# Patient Record
Sex: MALE | URBAN - METROPOLITAN AREA
[De-identification: names, ages, dates, MRNs, and addresses within clinical notes are randomized per-mention and may not be internally consistent; named-entity substitution may affect disease eponyms.]

---

## 2022-12-27 ENCOUNTER — OFFICE VISIT (OUTPATIENT)
Dept: FAMILY MEDICINE CLINIC | Age: 37
End: 2022-12-27
Payer: COMMERCIAL

## 2022-12-27 VITALS
RESPIRATION RATE: 16 BRPM | TEMPERATURE: 97.4 F | BODY MASS INDEX: 45.1 KG/M2 | HEIGHT: 70 IN | SYSTOLIC BLOOD PRESSURE: 131 MMHG | WEIGHT: 315 LBS | HEART RATE: 76 BPM | OXYGEN SATURATION: 97 % | DIASTOLIC BLOOD PRESSURE: 74 MMHG

## 2022-12-27 DIAGNOSIS — E78.1 HYPERTRIGLYCERIDEMIA: ICD-10-CM

## 2022-12-27 DIAGNOSIS — G47.33 OSA ON CPAP: ICD-10-CM

## 2022-12-27 DIAGNOSIS — R63.5 RAPID WEIGHT GAIN: ICD-10-CM

## 2022-12-27 DIAGNOSIS — Z99.89 OSA ON CPAP: ICD-10-CM

## 2022-12-27 DIAGNOSIS — Z11.59 NEED FOR HEPATITIS C SCREENING TEST: ICD-10-CM

## 2022-12-27 DIAGNOSIS — M17.11 PRIMARY OSTEOARTHRITIS OF RIGHT KNEE: ICD-10-CM

## 2022-12-27 DIAGNOSIS — I48.0 PAROXYSMAL A-FIB (HCC): ICD-10-CM

## 2022-12-27 DIAGNOSIS — N39.0 RECURRENT UTI: ICD-10-CM

## 2022-12-27 DIAGNOSIS — N18.9 CHRONIC KIDNEY DISEASE, UNSPECIFIED CKD STAGE: ICD-10-CM

## 2022-12-27 DIAGNOSIS — E66.01 SEVERE OBESITY (BMI >= 40) (HCC): Primary | ICD-10-CM

## 2022-12-27 DIAGNOSIS — Q55.64 CONGENITAL BURIED PENIS: ICD-10-CM

## 2022-12-27 DIAGNOSIS — I10 PRIMARY HYPERTENSION: ICD-10-CM

## 2022-12-27 DIAGNOSIS — R45.89 DEPRESSED MOOD: ICD-10-CM

## 2022-12-27 PROCEDURE — 3078F DIAST BP <80 MM HG: CPT | Performed by: FAMILY MEDICINE

## 2022-12-27 PROCEDURE — 3075F SYST BP GE 130 - 139MM HG: CPT | Performed by: FAMILY MEDICINE

## 2022-12-27 PROCEDURE — 99204 OFFICE O/P NEW MOD 45 MIN: CPT | Performed by: FAMILY MEDICINE

## 2022-12-27 RX ORDER — DICLOFENAC SODIUM 75 MG/1
75 TABLET, DELAYED RELEASE ORAL 2 TIMES DAILY
COMMUNITY
Start: 2022-12-15 | End: 2023-01-14

## 2022-12-27 RX ORDER — CHOLECALCIFEROL (VITAMIN D3) 125 MCG
CAPSULE ORAL
COMMUNITY
Start: 2022-11-20

## 2022-12-27 RX ORDER — PANTOPRAZOLE SODIUM 40 MG/1
TABLET, DELAYED RELEASE ORAL
COMMUNITY
Start: 2022-12-01

## 2022-12-27 RX ORDER — PERPHENAZINE 2 MG
TABLET ORAL
COMMUNITY
Start: 2022-10-21

## 2022-12-27 RX ORDER — NITROFURANTOIN (MACROCRYSTALS) 100 MG/1
CAPSULE ORAL
COMMUNITY
Start: 2022-12-15

## 2022-12-27 RX ORDER — VALSARTAN AND HYDROCHLOROTHIAZIDE 320; 25 MG/1; MG/1
TABLET, FILM COATED ORAL
COMMUNITY
Start: 2022-11-20

## 2022-12-27 RX ORDER — METOPROLOL SUCCINATE 100 MG/1
TABLET, EXTENDED RELEASE ORAL
COMMUNITY
Start: 2022-12-01

## 2022-12-27 NOTE — PROGRESS NOTES
Chief Complaint   Patient presents with    Establish Care     New pt hx of depression noc at this time

## 2022-12-27 NOTE — PROGRESS NOTES
Family Medicine Initial Office Visit  Patient: Jackson Anderson  1985, 30 y.o., male  Encounter Date: 12/27/2022    ASSESSMENT & PLAN    ICD-10-CM ICD-9-CM    1. Severe obesity (BMI >= 40) (HCC)  E66.01 278.01 CBC W/O DIFF      METABOLIC PANEL, COMPREHENSIVE      LIPID PANEL      TSH 3RD GENERATION      T4, FREE      REFERRAL TO WEIGHT LOSS      2. Hypertriglyceridemia  Z43.2 012.3 METABOLIC PANEL, COMPREHENSIVE      LIPID PANEL      3. Rapid weight gain  R63.5 783.1 CBC W/O DIFF      METABOLIC PANEL, COMPREHENSIVE      LIPID PANEL      TSH 3RD GENERATION      T4, FREE      4. Paroxysmal A-fib (HCC)  I48.0 427.31 CBC W/O DIFF      METABOLIC PANEL, COMPREHENSIVE      LIPID PANEL      TSH 3RD GENERATION      T4, FREE      5. Primary hypertension  I10 401.9 CBC W/O DIFF      METABOLIC PANEL, COMPREHENSIVE      LIPID PANEL      6. Primary osteoarthritis of right knee  M17.11 715.16       7. Depressed mood  R45.89 799.29       8. Recurrent UTI  N39.0 599.0 REFERRAL TO UROLOGY      9. Chronic kidney disease, unspecified CKD stage  N18.9 585.9 REFERRAL TO UROLOGY      10. Congenital buried penis  Q55.64 752.65 REFERRAL TO UROLOGY      11. DINH on CPAP  G47.33 327.23     Z99.89 V46.8       12.  Need for hepatitis C screening test  Z11.59 V73.89 HEPATITIS C AB        Orders Placed This Encounter    CBC W/O DIFF     Standing Status:   Future     Standing Expiration Date:   38/31/8633    METABOLIC PANEL, COMPREHENSIVE     Standing Status:   Future     Standing Expiration Date:   12/27/2023    LIPID PANEL     Standing Status:   Future     Standing Expiration Date:   12/27/2023    TSH 3RD GENERATION     Standing Status:   Future     Standing Expiration Date:   12/27/2023    T4, FREE     Standing Status:   Future     Standing Expiration Date:   12/27/2023    HEPATITIS C AB - Future Default     Standing Status:   Future     Standing Expiration Date:   12/27/2023    Mega Weight Loss     Referral Priority:   Routine Referral Type:   Consultation     Referral Reason:   Specialty Services Required     Referred to Provider:   Alex Gudino MD     Number of Visits Requested:   [address] [facility]     Referral Priority:   Routine     Referral Type:   Consultation     Referral Reason:   Specialty Services Required     Referred to Provider:   Isaura Herrera MD     Requested Specialty:   Urology     Number of Visits Requested:   1    diclofenac EC (VOLTAREN) 75 mg EC tablet     Sig: Take 75 mg by mouth two (2) times a day. cholecalciferol (VITAMIN D3) (5000 Units /125 mcg) capsule    Omega 3-6-9 1,200 mg cap    metoprolol succinate (TOPROL-XL) 100 mg tablet    nitrofurantoin (MACRODANTIN) 100 mg capsule    pantoprazole (PROTONIX) 40 mg tablet    valsartan-hydroCHLOROthiazide (DIOVAN-HCT) 320-25 mg per tablet     Patient Instructions   Here today to estb care    Labs fasting per my orders    Continue care with cardiology    See a dentist    Add fiber (metamucil)    Plan your packed food in case you stay longer at work    Get accountability for exercise with smoeone in your life    Wl referral (medical)    Keep fu with ortho as needed    Take NSAIDS only with food    Stay with PAR for DINH    Ref to urology as discussed    Follow Cr closely may need care with nephro [facility]  Chief Complaint   Patient presents with    [address] pt hx of depression noc at this time         SOUTH Luque is a 30 y.o. male presenting today for establishing care.    Patient works as   Patient lives in a apt with roommate and partner   Patient was last seen by primary care Dr Varun Gr at [facility] on [address], last seen September     Exercise: trying to go on walks, a few times a week, workin  Diet / Eligio Calk now excluding eggs, watching calories trying for a higher protein content  Weight is fluctuating, it goes up and down--lowest he's been is about 370, he has a sedentary job, gained weight back 100 or so last year    Tobacco: no  EtOH:occ, not in a long time  Illicit Substances:no    Sexual Activity:yes one female partner, condoms and she's got an IUD  Declines std testing today      HTN--on valsartan hctz  Recurrent UTI-did have a urologist, trying to get back to that  Chronic Diarrhea (thought it was related to abx use for UTI, but he thinks he's sensitive to egg and dietary changes might be helping)  Afib (paroxysmal)--he sees Dr Arabella Baxter and he feels it when it happens  CKD/Renal impairment, he says this was from a urinary tract obstruction unrelated to stone  Depression/depressed mood  R knee injury--a few years ago slipped on ice and [facility] and he tried to just do supportive care  Then he was going through a very depressed mood and he tripped while cleaning the house, he felt a tearing feel in his knee, immediate swelling and hot and pain with ambulation and limited rom  Suspected at that time meniscal tear  Recently went to see Dr Adan Martinez at [facility]  Dx arthritis significantly  Now on diclofenac and got a steroid shot that helped a ton he tells me  100 lbs weight gain in the last year due to ortho injury without being able to walk and move      Was rxed macrobid at one point    Scheduled for gastro at some point--[facility]  Planning for endoscopy and cscope, he tells me he thinks he might have an ulcer--has had a lot of nsaids, doesn't traditionally take with food he says  Mid sternal ane upper epigastric pain, gnawing and sometimes temporarily improved with food but not consistent    Maternal GF smoker-lung ca  No one has colon cancer in family  Father is , had sepsis    Seeing a therapist through his E[facility] at [facility] help  During covid he was out of work a lot and that was a financial struggle  Was seeing psychiatrist at one point  Was on meds but had vivid dreams  Maybe was on lexapro, was on trazodone, felt tired, had terrible vivid disruptive dreams    Obesity: not interested in surgery    Hx buried penis his whole life, tells me not related to weight     Sleep apnea--on CPAP    HISTORICAL LABS  Result Type Result Value Relevant  Reference  Range  Interpretation Date  HDL 29.9 mg/dL >40.0 L 09/23/2022  10:41:00  Calculated LDL 69.3 mg/dL <100.0 No Flag 09/23/2022  10:41:00  Cholesterol 158 mg/dL <200 No Flag 09/23/2022  10:41:00  CHOL/HDL Ratio 5.3 Ratio <4.9 H 09/23/2022  10:41:00  Triglycerides 294 mg/dL <150 H 09/23/2022  10:41:00  VLDL 59 mg/dL 0-40 H 09/23/2022  10:41:00  eGFR Non--  American  49 mL/min/1.7 >60 L 09/23/2022  10:21:00  eGFR -  American  59 mL/min/1.7 >60 L 09/23/2022  10:21:00  Glucose 87 mg/dL 65-99 No Flag 09/23/2022  10:21:00  Sodium 142 mmol/L 136-145 No Flag 09/23/2022  10:21:00  Potassium 4.3 mmol/L 3.5-5.1 No Flag 09/23/2022  10:21:00  Chloride 105 mmol/L  No Flag 09/23/2022  10:21:00  CO2 21 mmol/L 20-31 No Flag 09/23/2022  10:21:00  Result Type Result Value Relevant  Reference  Range  Interpretation Date  BUN 30 mg/dL 9-23 H 09/23/2022  10:21:00  Creatinine 1.62 mg/dL 0.70-1.30 H 09/23/2022  10:21:00  BUN/Creat Ratio 18.5 Ratio 10.0-30.0 No Flag 09/23/2022  10:21:00  Calcium 9.3 mg/dL 8.3-10.6 No Flag 09/23/2022  10:21:00  Total Protein 7.4 g/dL 5.7-8.2 No Flag 09/23/2022  10:21:00  Albumin 4.2 g/dL 3.2-4.8 No Flag 09/23/2022  10:21:00  A/G Ratio 1.3 Ratio 1.0-2.5 No Flag 09/23/2022  10:21:00  Bilirubin Total 0.4 mg/dL 0.3-1.2 No Flag 09/23/2022  10:21:00  Alkaline  Phosphatase  82 U/L  No Flag 09/23/2022  10:21:00  AST 17 U/L <34 No Flag 09/23/2022  10:21:00  ALT 12 U/L 10-49 No Flag 09/23/2022  10:21:00  Calculated  Osmolality  299.55  mOsmol/kg  275..00 H 09/23/2022  10:21:00  FINAL REPORT Microbiology  results  -- A 05/27/2022  11:41:00  Hyaline Casts 0-8 /LPF 0-8 No Flag 05/25/2022  12:18:00  Result Type Result Value Relevant  Reference  Range  Interpretation Date  Color Yellow Yellow No Flag 2022  12:18:00  Clarity Cloudy Clear A 2022  12:18:00  Glucose Negative Negative No Flag 2022  12:18:00  Bilirubin Negative Negative No Flag 2022  12:18:00  Ketone Negative Negative No Flag 2022  12:18:00  Specific Gravity 1.015 1.005-1.030 No Flag 2022  12:18:00  pH 6.0 5.0-8.0 No Flag 2022  12:18:00  Protein Trace Negative A 2022  12:18:00  Urobilinogen 0.2 EU/dL 0.2-1.0 No Flag 2022  12:18:00  Nitrite Positive Negative A 2022  12:18:00  Blood 1+ Negative A 2022  12:18:00  Leukocyte Esterase 3+ Negative A 2022  12:18:00  Urine WBC 26-50 /HPF 0-5 A 2022  12:18:00  Urine RBC 4-5 /HPF 0-3 A 2022  12:18:00  Result Type Result Value Relevant  Reference  Range  Interpretation Date  Urine Bacteria 4+ /HPF Negative A 2022  12:18:00  Urine Epithelial Cells 0-5 /HPF 0-5 No Flag 2022  12:18:00  Immature  Granulocyte  Absolute  0.06 x10E3/uL 0.00-0.30 No Flag 2022  10:42:00  WBC 9.66 x10E3/uL 4.23-9.07 H 2022  10:42:00  RBC 5.35 x10E6/uL 4.63-6.08 No Flag 2022  10:42:00  Hemoglobin 15.1 g/dL 13.7-17.5 No Flag 2022  10:42:00  Hematocrit 48.8 % 40.1-51.0 No Flag 2022  10:42:00  MCV 91.2 fL 80.6-104.4 No Flag 2022  10:42:00  MCH 28.2 pg 23.9-32.5 No Flag 2022  10:42:00  MCHC 30.9 g/dL 28.6-33.2 No Flag 2022  10:42:00  RDW 14.7 % 11.6-14.4 H 2022  10:42:00  MPV 11.3 fL 9.4-12.4 No Flag 2022  10:42:00  Platelet Count 864 /-337 H 2022  10:42:00  Neutrophils 70.0 % 34.0-67.9 H 2022  10:42:00  Result Type Result Value Relevant  Reference  Range  Interpretation Date  Lymphocytes 17.2 % 21.8-53.1 L 2022  10:42:00  Monocytes 7.0 % 5.3-12.2 No Flag 2022  10:42:00  Eosinophils 4.6 % 0.8-7.0 No Flag 2022  10:42:00  Basophils 0.6 % 0.2-1.2 No Flag 2022  10:42:00  Immature  Granulocytes  0.6 % 0.0-0.4 H 05/25/2022  10:42:00  Neutrophil Absolute 6.76 x10E3/uL 1.78-5.38 H 05/25/2022  10:42:00  Lymphocyte  Absolute  1.66 x10E3/uL 1.32-3.57 No Flag 05/25/2022  10:42:00  Monocyte Absolute 0.68 x10E3/uL 0.30-0.82 No Flag 05/25/2022  10:42:00  Eosinophil Absolute 0.44 x10E3/uL 0.04-0.54 No Flag 05/25/2022  10:42:00  Basophil Absolute 0.06 x10E3/uL 0.01-0.08 No Flag 05/25/2022  10:42:00  HDL 36.4 mg/dL >40.0 L 05/25/2022  09:46:00  Cholesterol 154 mg/dL <200 No Flag 05/25/2022  09:46:00  CHOL/HDL Ratio 4.2 Ratio <4.9 No Flag 05/25/2022  09:46:00  Triglycerides 158 mg/dL <150 H 05/25/2022  09:46:00  Result Type Result Value Relevant  Reference  Range  Interpretation Date  VLDL 32 mg/dL 0-40 No Flag 05/25/2022  09:46:00  Calculated LDL 86.0 mg/dL <100.0 No Flag 05/25/2022  09:46:00  Glucose 104 mg/dL 65-99 H 05/25/2022  09:45:00  eGFR Non-African-  American  46 mL/min/1.7 >60 L 05/25/2022  09:45:00  eGFR -  American  56 mL/min/1.7 >60 L 05/25/2022  09:45:00  Sodium 139 mmol/L 136-145 No Flag 05/25/2022  09:45:00  Potassium 4.3 mmol/L 3.5-5.1 No Flag 05/25/2022  09:45:00  Chloride 102 mmol/L  No Flag 05/25/2022  09:45:00  CO2 25 mmol/L 20-31 No Flag 05/25/2022  09:45:00  BUN 40 mg/dL 9-23 H 05/25/2022  09:45:00  Creatinine 1.69 mg/dL 0.70-1.30 H 05/25/2022  09:45:00  BUN/Creat Ratio 23.7 Ratio 10.0-30.0 No Flag 05/25/2022  09:45:00  Calcium 10.0 mg/dL 8.3-10.6 No Flag 05/25/2022  09:45:00  Total Protein 7.7 g/dL 5.7-8.2 No Flag 05/25/2022  09:45:00  Result Type Result Value Relevant  Reference  Range  Interpretation Date  Albumin 4.2 g/dL 3.2-4.8 No Flag 05/25/2022  09:45:00  A/G Ratio 1.2 Ratio 1.0-2.5 No Flag 05/25/2022  09:45:00  Bilirubin Total 0.5 mg/dL 0.3-1.2 No Flag 05/25/2022  09:45:00  Alkaline  Phosphatase  79 U/L  No Flag 05/25/2022  09:45:00  AST 17 U/L <34 No Flag 05/25/2022  09:45:00  ALT 10 U/L 10-49 No Flag 05/25/2022  09:45:00  Calculated  Osmolality  298.06  mOsmol/kg  275..00 H 05/25/2022  09:45:00  T4 Free 1.26 ng/dL 0.89-1.76 No Flag 05/25/2022  09:42:00  TSH 1.17 uIU/mL 0.55-4.78 No Flag 05/25/2022  09:41:00  T3 Total 102 ng/dL  No Flag 05/25/2022  09:40:00  Vitamin B12 285 pg/mL 211-911 No Flag 05/25/2022  09:40:00  Vitamin D 32.2 ng/mL 30.0-100.0 No Flag 05/25/2022  09:39:00  HbA1C 6.0 % 4.8-5.6 H 05/25/2022  09:38:00    Has FMLA for his knee from his prior pcp--his work suggested bc of his diarrhea, if he has any findings he should have paperwork pulled up for that  Review of Systems  A 12 point review of systems was negative except as noted here or in the HPI. OBJECTIVE  Visit Vitals  /74   Pulse 76   Temp 97.4 °F (36.3 °C)   Resp 16   Ht 5' 10\" (1.778 m)   Wt (!) 545 lb (247.2 kg)   SpO2 97%   BMI 78.20 kg/m²       Physical Exam  Vitals and nursing note reviewed. Constitutional:       General: He is not in acute distress. Appearance: Normal appearance. He is normal weight. He is not ill-appearing, toxic-appearing or diaphoretic. Comments: Walking with single point cane   HENT:      Head: Normocephalic and atraumatic. Right Ear: External ear normal.      Left Ear: External ear normal.      Mouth/Throat:      Mouth: Mucous membranes are moist.   Eyes:      General: No scleral icterus. Extraocular Movements: Extraocular movements intact. Pupils: Pupils are equal, round, and reactive to light. Cardiovascular:      Rate and Rhythm: Normal rate and regular rhythm. Heart sounds: Normal heart sounds. No murmur heard. No friction rub. No gallop. Pulmonary:      Effort: Pulmonary effort is normal. No respiratory distress. Breath sounds: No stridor. No wheezing or rhonchi. Comments: Distant breath sounds  Musculoskeletal:      Right lower leg: Edema present. Left lower leg: Edema present. Skin:     Coloration: Skin is not jaundiced or pale. Findings: No bruising, erythema, lesion or rash.    Neurological:      General: No focal deficit present. Mental Status: He is alert. Cranial Nerves: No cranial nerve deficit. Gait: Gait normal.   Psychiatric:         Mood and Affect: Mood normal.         Behavior: Behavior normal.         Thought Content: Thought content normal.         Judgment: Judgment normal.       No results found for any visits on 12/27/22. HISTORICAL  Reviewed and updated today, and as noted below:    Past Medical History:   Diagnosis Date    A-fib ([address]     Arthritis     Depression     GERD (gastroesophageal reflux disease)     Hypertension      History reviewed. No pertinent surgical history. Family History   Problem Relation Age of Onset    No Known Problems Mother      Social History     Tobacco Use   Smoking Status Never   Smokeless Tobacco Never     Social History     Socioeconomic History    Marital status: SINGLE   Tobacco Use    Smoking status: Never    Smokeless tobacco: Never     Allergies   Allergen Reactions    Doxycycline Palpitations       No visits with results within 3 Month(s) from this visit. Latest known visit with results is:   No results found for any previous visit. Bal Bright MD  [facility]  12/27/22 10:36 AM    Portions of this note may have been populated using smart dictation software and may have \"sounds-like\" errors present. Pt was counseled on risks, benefits and alternatives of treatment options. All questions were asked and answered and the patient was agreeable with the treatment plan as outlined.

## 2022-12-27 NOTE — PATIENT INSTRUCTIONS
Here today to estb care    Labs fasting per my orders    Continue care with cardiology    See a dentist    Add fiber (metamucil)    Plan your packed food in case you stay longer at work    Get accountability for exercise with smoeone in your life    Wl referral (medical)    Keep fu with ortho as needed    Take NSAIDS only with food    Stay with PAR for DINH    Ref to urology as discussed    Follow Cr closely may need care with nephro down th addie

## 2023-01-03 RX ORDER — METOPROLOL SUCCINATE 100 MG/1
100 TABLET, EXTENDED RELEASE ORAL DAILY
Qty: 90 TABLET | Refills: 0 | Status: SHIPPED | OUTPATIENT
Start: 2023-01-03

## 2023-01-11 DIAGNOSIS — E66.01 MORBID OBESITY (HCC): ICD-10-CM

## 2023-01-11 DIAGNOSIS — Z76.89 ENCOUNTER FOR WEIGHT MANAGEMENT: Primary | ICD-10-CM

## 2023-01-12 NOTE — PROGRESS NOTES
Patient attended our VIRTUAL Medically Supervised Weight Loss New Patient Orientation on Wednesday January 11, 2023 where we discussed:  New Direction Very Low and the Low Calorie diet details  Medical Supervision  Nutrition education  Cost of Meal Replacements

## 2023-01-23 ENCOUNTER — PATIENT MESSAGE (OUTPATIENT)
Dept: FAMILY MEDICINE CLINIC | Age: 38
End: 2023-01-23

## 2023-01-23 RX ORDER — NITROFURANTOIN 25; 75 MG/1; MG/1
100 CAPSULE ORAL DAILY
Qty: 30 CAPSULE | Refills: 1 | Status: SHIPPED | OUTPATIENT
Start: 2023-01-23

## 2023-01-23 RX ORDER — VALSARTAN AND HYDROCHLOROTHIAZIDE 320; 25 MG/1; MG/1
1 TABLET, FILM COATED ORAL DAILY
Qty: 90 TABLET | Refills: 0 | Status: SHIPPED | OUTPATIENT
Start: 2023-01-23

## 2023-01-23 NOTE — TELEPHONE ENCOUNTER
From: Minus Landau  To: David Michaud MD  Sent: 1/23/2023 9:25 AM EST  Subject: Medication refill. Good morning Doctor Jose Nurse,    I apologize for the bother, but I am contacting you for additional refills that are not yet entered through the sariah. My Valsartan is out. Additionally, I have yet to be able to schedule a meeting with the urologist and I'm also out of antibiotics. I've attached pictures of the empty bottles for reference on the prescription and dosage. Any help would be greatly appreciated. Thank you very much!

## 2023-03-02 ENCOUNTER — OFFICE VISIT (OUTPATIENT)
Dept: FAMILY MEDICINE CLINIC | Age: 38
End: 2023-03-02

## 2023-03-02 VITALS
RESPIRATION RATE: 20 BRPM | OXYGEN SATURATION: 96 % | BODY MASS INDEX: 45.1 KG/M2 | TEMPERATURE: 97.2 F | HEIGHT: 70 IN | DIASTOLIC BLOOD PRESSURE: 69 MMHG | HEART RATE: 83 BPM | WEIGHT: 315 LBS | SYSTOLIC BLOOD PRESSURE: 131 MMHG

## 2023-03-02 DIAGNOSIS — N18.30 STAGE 3 CHRONIC KIDNEY DISEASE, UNSPECIFIED WHETHER STAGE 3A OR 3B CKD (HCC): Primary | ICD-10-CM

## 2023-03-02 DIAGNOSIS — E66.01 SEVERE OBESITY (BMI >= 40) (HCC): ICD-10-CM

## 2023-03-02 DIAGNOSIS — I10 PRIMARY HYPERTENSION: ICD-10-CM

## 2023-03-02 DIAGNOSIS — I48.0 PAROXYSMAL A-FIB (HCC): ICD-10-CM

## 2023-03-02 DIAGNOSIS — R25.2 LEG CRAMPING: ICD-10-CM

## 2023-03-02 RX ORDER — DICLOFENAC SODIUM 75 MG/1
75 TABLET, DELAYED RELEASE ORAL 2 TIMES DAILY
COMMUNITY
Start: 2023-02-06 | End: 2023-03-08

## 2023-03-02 NOTE — PATIENT INSTRUCTIONS
Keto weight loss medically supervised:  [email or web address]    CKD3 was known prior, will watch, caution with NSAIDS    pAfib per cardiology    Bp is at goal today    Pending urology appt     Try magnesium oxide 400-800 mg at bedtime

## 2023-03-02 NOTE — PROGRESS NOTES
Family Medicine Follow-Up Progress Note  Patient: Lilia Denny  1985, 41 y.o., male  Encounter Date: 3/2/2023    ASSESSMENT & PLAN    ICD-10-CM ICD-9-CM    1. Stage 3 chronic kidney disease, unspecified whether stage 3a or 3b CKD ([facility])  N18.30 585.3 REFERRAL TO NEPHROLOGY      2. Paroxysmal A-fib ([facility])  I48.0 427.31       3. Severe obesity (BMI >= 40) ([facility])  E66.01 278.01 naltrexone-buPROPion (CONTRAVE) 8-90 mg TbER ER tablet      REFERRAL TO WEIGHT LOSS      4. Primary hypertension  I10 401.9       5. Leg cramping  R25.2 729.82           Orders Placed This Encounter    REFERRAL TO WEIGHT LOSS     Referral Priority:   Routine     Referral Type:   Consultation     Referral Reason:   Specialty Services Required     Referred to Provider:   aMrtinez Solis MD     Number of Visits Requested:   [facility]     Referral Priority:   Routine     Referral Type:   Consultation     Referral Reason:   Specialty Services Required     Referred to Provider:   Lord Meri MD     Number of Visits Requested:   1    diclofenac EC (VOLTAREN) 75 mg EC tablet     Sig: Take 75 mg by mouth two (2) times a day. naltrexone-buPROPion (CONTRAVE) 8-90 mg TbER ER tablet     Sig: First Month: Contrave 8mg/90mg #70 Week 1 : 1 Tab AM Week 2 : 1 Tab AM, 1 Tab PM Week 3 : 2 Tab AM, 1 Tab PM Week 4 : 2 Tab AM, 2 Tab PM     Dispense:  70 Tablet     Refill:  0       Patient Instructions   Keto weight loss medically supervised:  [email or web address]    CKD3 was known prior, will watch, caution with NSAIDS    pAfib per cardiology    Bp is at goal today    Pending urology appt     Try magnesium oxide 400-800 mg at bedtime     CHIEF COMPLAINT  Chief Complaint   Patient presents with    Follow-up     Patient would like to discuss weight loss.        SUBJECTIVE  Lilia Denny is a 41 y.o. male presenting today for follow up    Patient tells me that he attended the zoom call for the medical weight loss  The cost of medical weight loss was higher  He'd be ok to get [facility] the [facility]     He's working on lifestyle interventions  He's packing lunch  He's had wellbutrin before for mood and he did well with that    Desires weight loss  Desires non surgial options    Labs done prior to visit reviewed  ROS  Review of Systems  A 12 point review of systems was negative except as noted here or in the HPI. OBJECTIVE  Visit Vitals  /69 (BP 1 Location: Left arm, BP Patient Position: Sitting, BP Cuff Size: Thigh)   Pulse 83   Temp 97.2 °F (36.2 °C) (Temporal)   Resp 20   Ht 5' 10\" (1.778 m)   Wt (!) 545 lb (247.2 kg)   SpO2 96%   BMI 78.20 kg/m²       Physical Exam  Vitals reviewed. Constitutional:       General: He is not in acute distress. Appearance: Normal appearance. He is normal weight. He is not ill-appearing, toxic-appearing or diaphoretic. HENT:      Head: Normocephalic and atraumatic. Right Ear: External ear normal.      Left Ear: External ear normal.      Mouth/Throat:      Mouth: Mucous membranes are moist.   Eyes:      General: No scleral icterus. Neck:      Comments: Multiple skin tages, scabbed one at posterior R neck  Cardiovascular:      Heart sounds: No friction rub. No gallop. Pulmonary:      Effort: Pulmonary effort is normal. No respiratory distress. Breath sounds: No stridor. No wheezing or rhonchi. Abdominal:      General: Bowel sounds are normal.      Palpations: Abdomen is soft. Musculoskeletal:      Right lower leg: No edema. Left lower leg: No edema. Skin:     Coloration: Skin is not jaundiced or pale. Findings: No bruising, erythema, lesion or rash. Neurological:      General: No focal deficit present. Mental Status: He is alert. Cranial Nerves: No cranial nerve deficit. Gait: Gait normal.   Psychiatric:         Mood and Affect: Mood normal.         Behavior: Behavior normal.         Thought Content:  Thought content normal.         Judgment: Judgment normal.       No results found for any visits on 03/02/23. HISTORICAL  Reviewed and updated today, and as noted below:    Past Medical History:   Diagnosis Date    A-fib ([address]     Arthritis     Depression     GERD (gastroesophageal reflux disease)     Hypertension      History reviewed. No pertinent surgical history. Family History   Problem Relation Age of Onset    No Known Problems Mother      Social History     Tobacco Use   Smoking Status Never   Smokeless Tobacco Never     Social History     Socioeconomic History    Marital status: SINGLE   Tobacco Use    Smoking status: Never    Smokeless tobacco: Never     Allergies   Allergen Reactions    Doxycycline Palpitations       LAB REVIEW  Lab Results   Component Value Date/Time    Sodium 143 12/27/2022 12:36 PM    Potassium 5.0 12/27/2022 12:36 PM    Chloride 110 (H) 12/27/2022 12:36 PM    CO2 29 12/27/2022 12:36 PM    Anion gap 4 (L) 12/27/2022 12:36 PM    Glucose 89 12/27/2022 12:36 PM    BUN 33 (H) 12/27/2022 12:36 PM    Creatinine 1.90 (H) 12/27/2022 12:36 PM    BUN/Creatinine ratio 17 12/27/2022 12:36 PM    Calcium 9.0 12/27/2022 12:36 PM    Bilirubin, total 0.7 12/27/2022 12:36 PM    Alk.  phosphatase 83 12/27/2022 12:36 PM    Protein, total 7.4 12/27/2022 12:36 PM    Albumin 3.4 (L) 12/27/2022 12:36 PM    Globulin 4.0 12/27/2022 12:36 PM    A-G Ratio 0.9 (L) 12/27/2022 12:36 PM    ALT (SGPT) 19 12/27/2022 12:36 PM     Lab Results   Component Value Date/Time    WBC 8.9 12/27/2022 12:36 PM    HGB 13.9 12/27/2022 12:36 PM    HCT 42.8 12/27/2022 12:36 PM    PLATELET 863 68/19/6503 12:36 PM    MCV 88.6 12/27/2022 12:36 PM     No results found for: HBA1C, JGC2ZFTU, JPQ5MVRU, OSL6KDMZ  Lab Results   Component Value Date/Time    Cholesterol, total 167 12/27/2022 12:36 PM    HDL Cholesterol 41 12/27/2022 12:36 PM    LDL, calculated 82.2 12/27/2022 12:36 PM    VLDL, calculated 43.8 12/27/2022 12:36 PM    Triglyceride 219 (H) 12/27/2022 12:36 PM    CHOL/HDL Ratio 4.1 12/27/2022 12:36 PM           Flor Simon MD  [facility]  03/02/23 1:35 PM    Portions of this note may have been populated using smart dictation software and may have \"sounds-like\" errors present. Pt was counseled on risks, benefits and alternatives of treatment options. All questions were asked and answered and the patient was agreeable with the treatment plan as outlined.

## 2023-03-02 NOTE — PROGRESS NOTES
Chief Complaint   Patient presents with    Follow-up     1. Have you been to the ER, urgent care clinic since your last visit? Hospitalized since your last visit? No    2. Have you seen or consulted any other health care providers outside of the [address] [place] [address] since your last visit? Include any pap smears or colon screening.  No

## 2023-03-13 ENCOUNTER — TELEPHONE (OUTPATIENT)
Dept: FAMILY MEDICINE CLINIC | Age: 38
End: 2023-03-13

## 2023-03-13 NOTE — TELEPHONE ENCOUNTER
I would defer the management of phentermine to a weight loss specialist, this is not a medication I manage often and is only indicated for 12 weeks according to FDA  We could consider wellbutrin alone (1/2 of contrave) if he is ok with that while he's getting plugged into the weight loss team (already info given/referral placed)

## 2023-03-13 NOTE — TELEPHONE ENCOUNTER
Contreve not covered. Possible alternatives below.            Benzphetamine 50 Mg Tab Not Required  Diethylpropion (Tab) Not Required  Lomaira Not Required  Phendimetrazine Tartrate (Tab) Not Required  Phentermine 15 Mg Cap Not Required  Qsymia 7.5-46 Mg CM24 Not Required

## 2023-04-04 RX ORDER — METOPROLOL SUCCINATE 100 MG/1
TABLET, EXTENDED RELEASE ORAL
Qty: 90 TABLET | Refills: 0
Start: 2023-04-04

## 2023-04-24 RX ORDER — VALSARTAN AND HYDROCHLOROTHIAZIDE 320; 25 MG/1; MG/1
1 TABLET, FILM COATED ORAL DAILY
Qty: 90 TABLET | Refills: 0 | Status: SHIPPED | OUTPATIENT
Start: 2023-04-24

## 2023-05-22 ENCOUNTER — PATIENT MESSAGE (OUTPATIENT)
Facility: CLINIC | Age: 38
End: 2023-05-22

## 2023-05-24 NOTE — TELEPHONE ENCOUNTER
From: Evelyn Liang  Sent: 5/23/2023 7:24 PM EDT  To: Nenita Cabral [facility] Practice Clinical Staff  Subject: Urology Referral and FMLA    Thank you. I will be setting up an appointment ASAP. I've attached a PDF of the FMLA documents.  I would need two separate documents for the two conditions.    -Presley Cummings

## 2023-06-05 ENCOUNTER — TELEMEDICINE (OUTPATIENT)
Facility: CLINIC | Age: 38
End: 2023-06-05
Payer: COMMERCIAL

## 2023-06-05 DIAGNOSIS — Z78.9 ANTIBIOTIC DRUG INTOLERANCE: ICD-10-CM

## 2023-06-05 DIAGNOSIS — I10 ESSENTIAL (PRIMARY) HYPERTENSION: ICD-10-CM

## 2023-06-05 DIAGNOSIS — R39.9 UTI SYMPTOMS: ICD-10-CM

## 2023-06-05 DIAGNOSIS — N39.0 CHRONIC UTI: ICD-10-CM

## 2023-06-05 DIAGNOSIS — N18.31 STAGE 3A CHRONIC KIDNEY DISEASE (HCC): Primary | ICD-10-CM

## 2023-06-05 DIAGNOSIS — E55.9 VITAMIN D DEFICIENCY: ICD-10-CM

## 2023-06-05 DIAGNOSIS — N18.31 STAGE 3A CHRONIC KIDNEY DISEASE (HCC): ICD-10-CM

## 2023-06-05 DIAGNOSIS — G56.03 BILATERAL CARPAL TUNNEL SYNDROME: ICD-10-CM

## 2023-06-05 DIAGNOSIS — Z79.2 CHRONIC ANTIBIOTIC SUPPRESSION: ICD-10-CM

## 2023-06-05 PROCEDURE — 99214 OFFICE O/P EST MOD 30 MIN: CPT | Performed by: FAMILY MEDICINE

## 2023-06-05 RX ORDER — NITROFURANTOIN 25; 75 MG/1; MG/1
100 CAPSULE ORAL DAILY
Qty: 90 CAPSULE | Refills: 1 | Status: SHIPPED | OUTPATIENT
Start: 2023-06-05

## 2023-06-05 NOTE — PROGRESS NOTES
Call back - Left message to call back with any questions or concerns.   
Chief Complaint   Patient presents with    Follow-up     Fu for fmla paper work for urinary and gi issues which are being followed by pcp
under the [facility] waiver authority and the [employer] Act, this Virtual  Visit was conducted, with patient's (and/or legal guardian's) consent, to reduce the patient's risk of exposure to COVID-19 and provide necessary medical care. Services were provided through a video synchronous discussion virtually to substitute for in-person clinic visit. Patient and provider were located at their individual homes. Alesia Butcher MD  [facility]  06/05/23 4:30 PM     Portions of this note may have been populated using smart dictation software and may have \"sounds-like\" errors present.

## 2023-07-03 ENCOUNTER — PATIENT MESSAGE (OUTPATIENT)
Facility: CLINIC | Age: 38
End: 2023-07-03

## 2023-07-03 RX ORDER — METOPROLOL SUCCINATE 100 MG/1
100 TABLET, EXTENDED RELEASE ORAL DAILY
Qty: 90 TABLET | Refills: 0 | Status: SHIPPED | OUTPATIENT
Start: 2023-07-03

## 2023-07-03 RX ORDER — VALSARTAN AND HYDROCHLOROTHIAZIDE 320; 25 MG/1; MG/1
1 TABLET, FILM COATED ORAL DAILY
Qty: 90 TABLET | Refills: 0 | Status: SHIPPED | OUTPATIENT
Start: 2023-07-03

## 2023-07-03 NOTE — TELEPHONE ENCOUNTER
From: Tonia Ozuna  To: Dr. Keily Curiel: 7/3/2023 2:30 AM EDT  Subject: Prescription Refill Request 7/3    Dr Zoe De Luna,    I am in need of a refill for both my Valsartan and Metoprolol. For some reason I cannot request a refill through the shanell for those two as of yet. Thank you!

## 2023-07-13 ENCOUNTER — OFFICE VISIT (OUTPATIENT)
Age: 38
End: 2023-07-13
Payer: COMMERCIAL

## 2023-07-13 VITALS — HEIGHT: 71 IN | BODY MASS INDEX: 44.1 KG/M2 | WEIGHT: 315 LBS

## 2023-07-13 DIAGNOSIS — E66.01 MORBID OBESITY (HCC): ICD-10-CM

## 2023-07-13 DIAGNOSIS — N30.00 ACUTE CYSTITIS WITHOUT HEMATURIA: ICD-10-CM

## 2023-07-13 DIAGNOSIS — N39.0 URINARY TRACT INFECTION WITHOUT HEMATURIA, SITE UNSPECIFIED: Primary | ICD-10-CM

## 2023-07-13 LAB
BILIRUBIN, URINE, POC: NEGATIVE
BLOOD URINE, POC: ABNORMAL
GLUCOSE URINE, POC: NEGATIVE
KETONES, URINE, POC: NEGATIVE
LEUKOCYTE ESTERASE, URINE, POC: ABNORMAL
NITRITE, URINE, POC: POSITIVE
PH, URINE, POC: 5.5 (ref 4.6–8)
PROTEIN,URINE, POC: 30
SPECIFIC GRAVITY, URINE, POC: 1.01 (ref 1–1.03)
URINALYSIS CLARITY, POC: CLEAR
URINALYSIS COLOR, POC: YELLOW
UROBILINOGEN, POC: ABNORMAL

## 2023-07-13 PROCEDURE — 99204 OFFICE O/P NEW MOD 45 MIN: CPT | Performed by: UROLOGY

## 2023-07-13 PROCEDURE — 81003 URINALYSIS AUTO W/O SCOPE: CPT | Performed by: UROLOGY

## 2023-07-13 RX ORDER — POTASSIUM CITRATE 5 MEQ/1
5 TABLET, EXTENDED RELEASE ORAL
COMMUNITY

## 2023-07-13 RX ORDER — CALCIUM CARBONATE 500(1250)
500 TABLET ORAL DAILY
COMMUNITY

## 2023-07-13 RX ORDER — CHLORAL HYDRATE 500 MG
CAPSULE ORAL DAILY
COMMUNITY

## 2023-07-13 RX ORDER — MAGNESIUM 30 MG
30 TABLET ORAL 2 TIMES DAILY
COMMUNITY

## 2023-07-13 RX ORDER — CIPROFLOXACIN 500 MG/1
500 TABLET, FILM COATED ORAL 2 TIMES DAILY
Qty: 28 TABLET | Refills: 1 | Status: SHIPPED | OUTPATIENT
Start: 2023-07-13

## 2023-07-13 NOTE — PROGRESS NOTES
HISTORY OF PRESENT ILLNESS  Dalton Santos is a 41 y.o. male. has a past medical history of A-fib ([address]), Arthritis, Depression, GERD (gastroesophageal reflux disease), and Hypertension. has no past surgical history on file. Chief Complaint   Patient presents with    New Patient    Urinary Tract Infection     He has frequent UTIs with burning with urination, constant urgency, and urinary frequency. He also has baseline incontinence with cough, sneeze and urge. He can have to hold his urine a long time at work. He works as a . He does not always get a break. He has urinary tract problems since age 17. It has progressively worsening. Today he has 5-6/10 burning. It can get worse to a 10 if he does not take antibiotics. He saw [facility] at some point around 2014. He had urinary retention and needed a catheter. He felt something pop and had some blood. He drained 30# of water weight after the catheter. He had follow up cystoscopy without concerns. He thought his leakage was worse. He stopped seeing them because he thought they did not care about his sequelae of incontinence. He last saw someone 1. He also has a buried penis. He is morbidly obese. He has been gaining weight and losing weight. He maxed at 560, up from 375 in 2014. He is seeing a nutritionist.      He has been getting antibiotic therapy from [facility] and now his primary care. He has had UTIs about 5x since 2022. He describes a clean catch UA. Urinary Tract Infection  This is a chronic problem. 1. Urinary tract infection without hematuria, site unspecified  Assessment & Plan:  He has recurrent UTIs exacerbated by bowel and bladder habits. Work is long hours without relief. Orders:  -     AMB POC URINALYSIS DIP STICK AUTO W/O MICRO  -     Urinalysis with Microscopic  -     Culture, Urine  2.  Acute cystitis without hematuria  Assessment & Plan:   He is having

## 2023-07-13 NOTE — ASSESSMENT & PLAN NOTE
The patient was counseled to lose weight. The patient should set goals. I advised them to \"go to sleep hungry\". That involves eating less at night to achieve that goal.  Calorie deficit is needed for meaningful weight loss. Calorie excess trains the body for weight gain. This pattern will help them wake up hungry for breakfast.  Ideally shifting calorie intake earlier in the day may help the metabolism. He is working with a dietician. He is considering surgical weight loss.

## 2023-07-14 ENCOUNTER — TELEPHONE (OUTPATIENT)
Age: 38
End: 2023-07-14

## 2023-07-14 LAB
APPEARANCE UR: ABNORMAL
BACTERIA #/AREA URNS HPF: ABNORMAL /[HPF]
BILIRUB UR QL STRIP: NEGATIVE
CASTS URNS QL MICRO: ABNORMAL /LPF
COLOR UR: YELLOW
EPI CELLS #/AREA URNS HPF: ABNORMAL /HPF (ref 0–10)
GLUCOSE UR QL STRIP: NEGATIVE
HGB UR QL STRIP: ABNORMAL
KETONES UR QL STRIP: NEGATIVE
LEUKOCYTE ESTERASE UR QL STRIP: ABNORMAL
MICRO URNS: ABNORMAL
NITRITE UR QL STRIP: POSITIVE
PH UR STRIP: 6 [PH] (ref 5–7.5)
PROT UR QL STRIP: ABNORMAL
RBC #/AREA URNS HPF: >30 /HPF (ref 0–2)
SP GR UR STRIP: 1.01 (ref 1–1.03)
UROBILINOGEN UR STRIP-MCNC: 0.2 MG/DL (ref 0.2–1)
WBC #/AREA URNS HPF: >30 /HPF (ref 0–5)

## 2023-07-15 LAB — BACTERIA UR CULT: ABNORMAL

## 2023-07-17 DIAGNOSIS — N39.0 URINARY TRACT INFECTION WITHOUT HEMATURIA, SITE UNSPECIFIED: Primary | ICD-10-CM

## 2023-07-17 LAB — BACTERIA UR CULT: ABNORMAL

## 2023-07-17 RX ORDER — CEPHALEXIN 500 MG/1
500 CAPSULE ORAL 3 TIMES DAILY
Qty: 42 CAPSULE | Refills: 0 | Status: SHIPPED | OUTPATIENT
Start: 2023-07-17 | End: 2023-07-31

## 2023-08-24 ENCOUNTER — NURSE ONLY (OUTPATIENT)
Age: 38
End: 2023-08-24
Payer: COMMERCIAL

## 2023-08-24 DIAGNOSIS — R31.9 URINARY TRACT INFECTION WITH HEMATURIA, SITE UNSPECIFIED: Primary | ICD-10-CM

## 2023-08-24 DIAGNOSIS — N39.0 URINARY TRACT INFECTION WITH HEMATURIA, SITE UNSPECIFIED: Primary | ICD-10-CM

## 2023-08-24 LAB
BILIRUBIN, URINE, POC: NEGATIVE
BLOOD URINE, POC: NORMAL
GLUCOSE URINE, POC: NEGATIVE
KETONES, URINE, POC: NEGATIVE
LEUKOCYTE ESTERASE, URINE, POC: NORMAL
NITRITE, URINE, POC: POSITIVE
PH, URINE, POC: 5.5 (ref 4.6–8)
PROTEIN,URINE, POC: 100
SPECIFIC GRAVITY, URINE, POC: 1.02 (ref 1–1.03)
URINALYSIS CLARITY, POC: CLEAR
URINALYSIS COLOR, POC: YELLOW
UROBILINOGEN, POC: NORMAL

## 2023-08-24 PROCEDURE — 81003 URINALYSIS AUTO W/O SCOPE: CPT | Performed by: UROLOGY

## 2023-08-26 LAB — BACTERIA UR CULT: ABNORMAL

## 2023-08-28 LAB — BACTERIA UR CULT: ABNORMAL

## 2023-08-29 ENCOUNTER — TELEPHONE (OUTPATIENT)
Age: 38
End: 2023-08-29

## 2023-08-29 RX ORDER — CEPHALEXIN 500 MG/1
500 CAPSULE ORAL 3 TIMES DAILY
Qty: 30 CAPSULE | Refills: 0 | Status: SHIPPED | OUTPATIENT
Start: 2023-08-29 | End: 2023-09-08

## 2023-10-02 RX ORDER — METOPROLOL SUCCINATE 100 MG/1
100 TABLET, EXTENDED RELEASE ORAL DAILY
Qty: 90 TABLET | Refills: 0 | Status: SHIPPED | OUTPATIENT
Start: 2023-10-02

## 2023-10-17 ENCOUNTER — HOSPITAL ENCOUNTER (EMERGENCY)
Facility: HOSPITAL | Age: 38
Discharge: HOME | End: 2023-10-18
Attending: EMERGENCY MEDICINE
Payer: COMMERCIAL

## 2023-10-17 DIAGNOSIS — R19.7 DIARRHEA OF PRESUMED INFECTIOUS ORIGIN: ICD-10-CM

## 2023-10-17 DIAGNOSIS — R11.0 NAUSEA: Primary | ICD-10-CM

## 2023-10-17 DIAGNOSIS — N30.01 ACUTE CYSTITIS WITH HEMATURIA: ICD-10-CM

## 2023-10-17 LAB
ALBUMIN SERPL-MCNC: 4.3 G/DL (ref 3.5–5.2)
ALBUMIN/GLOB SERPL: 1.1 (ref 1.1–2.2)
ALP SERPL-CCNC: 85 U/L (ref 40–129)
ALT SERPL-CCNC: 8 U/L (ref 10–50)
ANION GAP SERPL CALC-SCNC: 16 MMOL/L (ref 5–15)
APPEARANCE UR: ABNORMAL
AST SERPL-CCNC: 25 U/L (ref 10–50)
BACTERIA URNS QL MICRO: ABNORMAL /HPF
BASOPHILS # BLD: 0.1 K/UL (ref 0–1)
BASOPHILS NFR BLD: 1 % (ref 0–1)
BILIRUB SERPL-MCNC: 0.8 MG/DL (ref 0.2–1)
BILIRUB UR QL: NEGATIVE
BUN SERPL-MCNC: 25 MG/DL (ref 6–20)
BUN/CREAT SERPL: 15 (ref 12–20)
CALCIUM SERPL-MCNC: 9.8 MG/DL (ref 8.6–10)
CHLORIDE SERPL-SCNC: 104 MMOL/L (ref 98–107)
CO2 SERPL-SCNC: 21 MMOL/L (ref 22–29)
COLOR UR: ABNORMAL
COMMENT:: NORMAL
CREAT SERPL-MCNC: 1.65 MG/DL (ref 0.7–1.2)
DIFFERENTIAL METHOD BLD: ABNORMAL
EOSINOPHIL # BLD: 0.3 K/UL (ref 0–0.4)
EOSINOPHIL NFR BLD: 3 % (ref 0–7)
EPITH CASTS URNS QL MICRO: ABNORMAL /LPF
ERYTHROCYTE [DISTWIDTH] IN BLOOD BY AUTOMATED COUNT: 14.6 % (ref 11.5–14.5)
GLOBULIN SER CALC-MCNC: 3.8 G/DL (ref 2–4)
GLUCOSE SERPL-MCNC: 105 MG/DL (ref 65–100)
GLUCOSE UR STRIP.AUTO-MCNC: NEGATIVE MG/DL
HCT VFR BLD AUTO: 44.9 % (ref 36.6–50.3)
HGB BLD-MCNC: 14.8 G/DL (ref 12.1–17)
HGB UR QL STRIP: ABNORMAL
IMM GRANULOCYTES # BLD AUTO: 0 K/UL (ref 0–0.04)
IMM GRANULOCYTES NFR BLD AUTO: 0 % (ref 0–0.5)
KETONES UR QL STRIP.AUTO: NEGATIVE MG/DL
LACTATE BLD-SCNC: 3.31 MMOL/L (ref 0.4–2)
LEUKOCYTE ESTERASE UR QL STRIP.AUTO: ABNORMAL
LIPASE SERPL-CCNC: 24 U/L (ref 13–60)
LYMPHOCYTES # BLD: 2.1 K/UL (ref 0.8–3.5)
LYMPHOCYTES NFR BLD: 20 % (ref 12–49)
MCH RBC QN AUTO: 29.1 PG (ref 26–34)
MCHC RBC AUTO-ENTMCNC: 33 G/DL (ref 30–36.5)
MCV RBC AUTO: 88.4 FL (ref 80–99)
MONOCYTES # BLD: 0.9 K/UL (ref 0–1)
MONOCYTES NFR BLD: 8 % (ref 5–13)
NEUTS SEG # BLD: 7.4 K/UL (ref 1.8–8)
NEUTS SEG NFR BLD: 68 % (ref 32–75)
NITRITE UR QL STRIP.AUTO: POSITIVE
NRBC # BLD: 0 K/UL (ref 0–0.01)
NRBC BLD-RTO: 0 PER 100 WBC
PH UR STRIP: 6 (ref 5–8)
PLATELET # BLD AUTO: 334 K/UL (ref 150–400)
PMV BLD AUTO: 10.2 FL (ref 8.9–12.9)
POTASSIUM SERPL-SCNC: 4.1 MMOL/L (ref 3.5–5.1)
PROT SERPL-MCNC: 8.1 G/DL (ref 6.4–8.3)
PROT UR STRIP-MCNC: NEGATIVE MG/DL
RBC # BLD AUTO: 5.08 M/UL (ref 4.1–5.7)
RBC #/AREA URNS HPF: ABNORMAL /HPF (ref 0–5)
SODIUM SERPL-SCNC: 141 MMOL/L (ref 136–145)
SP GR UR REFRACTOMETRY: 1.01 (ref 1–1.03)
SPECIMEN HOLD: NORMAL
URINE CULTURE IF INDICATED: ABNORMAL
UROBILINOGEN UR QL STRIP.AUTO: 0.2 EU/DL (ref 0.2–1)
WBC # BLD AUTO: 10.8 K/UL (ref 4.1–11.1)
WBC URNS QL MICRO: ABNORMAL /HPF (ref 0–4)

## 2023-10-17 PROCEDURE — 99284 EMERGENCY DEPT VISIT MOD MDM: CPT

## 2023-10-17 PROCEDURE — 87186 SC STD MICRODIL/AGAR DIL: CPT

## 2023-10-17 PROCEDURE — 87506 IADNA-DNA/RNA PROBE TQ 6-11: CPT

## 2023-10-17 PROCEDURE — 83690 ASSAY OF LIPASE: CPT

## 2023-10-17 PROCEDURE — 87077 CULTURE AEROBIC IDENTIFY: CPT

## 2023-10-17 PROCEDURE — 96361 HYDRATE IV INFUSION ADD-ON: CPT

## 2023-10-17 PROCEDURE — 6370000000 HC RX 637 (ALT 250 FOR IP): Performed by: NURSE PRACTITIONER

## 2023-10-17 PROCEDURE — 87086 URINE CULTURE/COLONY COUNT: CPT

## 2023-10-17 PROCEDURE — 85025 COMPLETE CBC W/AUTO DIFF WBC: CPT

## 2023-10-17 PROCEDURE — 2580000003 HC RX 258: Performed by: NURSE PRACTITIONER

## 2023-10-17 PROCEDURE — 36415 COLL VENOUS BLD VENIPUNCTURE: CPT

## 2023-10-17 PROCEDURE — 83605 ASSAY OF LACTIC ACID: CPT

## 2023-10-17 PROCEDURE — 87449 NOS EACH ORGANISM AG IA: CPT

## 2023-10-17 PROCEDURE — 81001 URINALYSIS AUTO W/SCOPE: CPT

## 2023-10-17 PROCEDURE — 96374 THER/PROPH/DIAG INJ IV PUSH: CPT

## 2023-10-17 PROCEDURE — 87324 CLOSTRIDIUM AG IA: CPT

## 2023-10-17 PROCEDURE — 80053 COMPREHEN METABOLIC PANEL: CPT

## 2023-10-17 PROCEDURE — 6360000002 HC RX W HCPCS: Performed by: NURSE PRACTITIONER

## 2023-10-17 RX ORDER — ONDANSETRON 2 MG/ML
4 INJECTION INTRAMUSCULAR; INTRAVENOUS EVERY 6 HOURS PRN
Status: DISCONTINUED | OUTPATIENT
Start: 2023-10-17 | End: 2023-10-18 | Stop reason: HOSPADM

## 2023-10-17 RX ORDER — DIPHENOXYLATE HYDROCHLORIDE AND ATROPINE SULFATE 2.5; .025 MG/1; MG/1
1 TABLET ORAL 4 TIMES DAILY PRN
Qty: 40 TABLET | Refills: 0 | Status: SHIPPED | OUTPATIENT
Start: 2023-10-17 | End: 2023-10-27

## 2023-10-17 RX ORDER — CIPROFLOXACIN 500 MG/1
500 TABLET, FILM COATED ORAL
Status: COMPLETED | OUTPATIENT
Start: 2023-10-17 | End: 2023-10-17

## 2023-10-17 RX ORDER — METRONIDAZOLE 500 MG/1
500 TABLET ORAL 2 TIMES DAILY
Qty: 14 TABLET | Refills: 0 | Status: SHIPPED | OUTPATIENT
Start: 2023-10-17 | End: 2023-10-24

## 2023-10-17 RX ORDER — 0.9 % SODIUM CHLORIDE 0.9 %
1000 INTRAVENOUS SOLUTION INTRAVENOUS ONCE
Status: COMPLETED | OUTPATIENT
Start: 2023-10-17 | End: 2023-10-18

## 2023-10-17 RX ORDER — ONDANSETRON 4 MG/1
4 TABLET, ORALLY DISINTEGRATING ORAL 3 TIMES DAILY PRN
Qty: 21 TABLET | Refills: 0 | Status: SHIPPED | OUTPATIENT
Start: 2023-10-17

## 2023-10-17 RX ORDER — METRONIDAZOLE 500 MG/1
500 TABLET ORAL
Status: COMPLETED | OUTPATIENT
Start: 2023-10-17 | End: 2023-10-17

## 2023-10-17 RX ORDER — CIPROFLOXACIN 500 MG/1
500 TABLET, FILM COATED ORAL 2 TIMES DAILY
Qty: 20 TABLET | Refills: 0 | Status: SHIPPED | OUTPATIENT
Start: 2023-10-17 | End: 2023-10-27

## 2023-10-17 RX ADMIN — SODIUM CHLORIDE 1000 ML: 9 INJECTION, SOLUTION INTRAVENOUS at 23:02

## 2023-10-17 RX ADMIN — SODIUM CHLORIDE 1000 ML: 9 INJECTION, SOLUTION INTRAVENOUS at 21:26

## 2023-10-17 RX ADMIN — CIPROFLOXACIN 500 MG: 500 TABLET, FILM COATED ORAL at 21:45

## 2023-10-17 RX ADMIN — ONDANSETRON 4 MG: 2 INJECTION INTRAMUSCULAR; INTRAVENOUS at 23:00

## 2023-10-17 RX ADMIN — METRONIDAZOLE 500 MG: 500 TABLET ORAL at 21:45

## 2023-10-17 ASSESSMENT — LIFESTYLE VARIABLES
HOW OFTEN DO YOU HAVE A DRINK CONTAINING ALCOHOL: NEVER
HOW MANY STANDARD DRINKS CONTAINING ALCOHOL DO YOU HAVE ON A TYPICAL DAY: PATIENT DOES NOT DRINK

## 2023-10-17 ASSESSMENT — PAIN SCALES - GENERAL: PAINLEVEL_OUTOF10: 5

## 2023-10-17 ASSESSMENT — PAIN DESCRIPTION - LOCATION: LOCATION: ABDOMEN

## 2023-10-17 ASSESSMENT — PAIN - FUNCTIONAL ASSESSMENT: PAIN_FUNCTIONAL_ASSESSMENT: 0-10

## 2023-10-18 VITALS
WEIGHT: 315 LBS | SYSTOLIC BLOOD PRESSURE: 126 MMHG | BODY MASS INDEX: 44.1 KG/M2 | HEIGHT: 71 IN | OXYGEN SATURATION: 96 % | TEMPERATURE: 98.4 F | RESPIRATION RATE: 20 BRPM | HEART RATE: 90 BPM | DIASTOLIC BLOOD PRESSURE: 89 MMHG

## 2023-10-18 LAB
C COLI+JEJUNI TUF STL QL NAA+PROBE: NEGATIVE
C DIFF GDH STL QL: NEGATIVE
C DIFF TOX A+B STL QL IA: NEGATIVE
C DIFF TOXIN INTERPRETATION: NORMAL
EC STX1+STX2 GENES STL QL NAA+PROBE: NEGATIVE
ETEC ELTA+ESTB GENES STL QL NAA+PROBE: NEGATIVE
LACTATE BLD-SCNC: 1.22 MMOL/L (ref 0.4–2)
P SHIGELLOIDES DNA STL QL NAA+PROBE: NEGATIVE
SALMONELLA SP SPAO STL QL NAA+PROBE: NEGATIVE
SHIGELLA SP+EIEC IPAH STL QL NAA+PROBE: NEGATIVE
V CHOL+PARA+VUL DNA STL QL NAA+NON-PROBE: NEGATIVE
Y ENTEROCOL DNA STL QL NAA+NON-PROBE: NEGATIVE

## 2023-10-18 PROCEDURE — 83605 ASSAY OF LACTIC ACID: CPT

## 2023-10-18 RX ORDER — LOPERAMIDE HYDROCHLORIDE 2 MG/1
4 CAPSULE ORAL
Status: DISCONTINUED | OUTPATIENT
Start: 2023-10-18 | End: 2023-10-18

## 2023-10-18 ASSESSMENT — ENCOUNTER SYMPTOMS
VOMITING: 0
ABDOMINAL PAIN: 1
BLOOD IN STOOL: 1
NAUSEA: 1
DIARRHEA: 1
RHINORRHEA: 0
SHORTNESS OF BREATH: 0

## 2023-10-18 ASSESSMENT — PAIN - FUNCTIONAL ASSESSMENT: PAIN_FUNCTIONAL_ASSESSMENT: NONE - DENIES PAIN

## 2023-10-18 NOTE — ED NOTES
IV removed prior to dc. Pt given dc paperwork. Pt tolerated dc well. Pt stated understanding of teaching and denied questions. Pt ambulated out of ER with all belongings. Pt is A&Ox4, VSS, NAD, symptoms improved.      Leelee Quezada RN  10/18/23 0030

## 2023-10-18 NOTE — ED PROVIDER NOTES
soon as possible for a visit       [facility]GENCY [facility]PT  [address]  [phone]  Go to   As needed, If symptoms worsen      DISCHARGE MEDICATIONS:  New Prescriptions    CIPROFLOXACIN (CIPRO) 500 MG TABLET    Take 1 tablet by mouth 2 times daily for 10 days    DIPHENOXYLATE-ATROPINE (LOMOTIL) 2.5-0.025 MG PER TABLET    Take 1 tablet by mouth 4 times daily as needed for Diarrhea for up to 10 days.  Max Daily Amount: 4 tablets    METRONIDAZOLE (FLAGYL) 500 MG TABLET    Take 1 tablet by mouth 2 times daily for 7 days    ONDANSETRON (ZOFRAN-ODT) 4 MG DISINTEGRATING TABLET    Take 1 tablet by mouth 3 times daily as needed for Nausea or Vomiting         (Please note that portions of this note were completed with a voice recognition program.  Efforts were made to edit the dictations but occasionally words are mis-transcribed.)    ARIEL Ingram NP (electronically signed)  Emergency Attending Physician / Physician Assistant / Nurse Practitioner             ARIEL Carter NP  10/18/23 [id number]

## 2023-10-18 NOTE — DISCHARGE INSTRUCTIONS
Clear liquid diet and bland foods (banana, rice, apples, toast)  until your diarrhea starts to improve, our hope is within 24-48 hours of starting new antibiotics. Your lab results are reassuring, but demonstrated some element of dehydration (increased kidney function numbers, elevated lactic acid). We sent stool and urine cultures, those will go to a lab and should result within the next 24-48 hours. Please call Dr. Barbra aSleem office tomorrow to set up a follow up appointment. Return to the ER for any worsening or worrisome symptoms.

## 2023-10-18 NOTE — ED TRIAGE NOTES
Pt c/o of diarrhea for the past 2 wks and at times having incontinent episodes     States that he is on abx frequently for UTIs     Has been on macrobid for the past 2wks    Denies hx of C-Diff

## 2023-10-20 LAB
BACTERIA SPEC CULT: ABNORMAL
CC UR VC: ABNORMAL
SERVICE CMNT-IMP: ABNORMAL

## 2023-10-28 ENCOUNTER — HOSPITAL ENCOUNTER (EMERGENCY)
Facility: HOSPITAL | Age: 38
Discharge: HOME | End: 2023-10-28
Attending: EMERGENCY MEDICINE
Payer: COMMERCIAL

## 2023-10-28 VITALS
OXYGEN SATURATION: 98 % | TEMPERATURE: 97.9 F | DIASTOLIC BLOOD PRESSURE: 100 MMHG | WEIGHT: 315 LBS | HEART RATE: 69 BPM | SYSTOLIC BLOOD PRESSURE: 150 MMHG | HEIGHT: 71 IN | RESPIRATION RATE: 18 BRPM | BODY MASS INDEX: 44.1 KG/M2

## 2023-10-28 DIAGNOSIS — K29.00 ACUTE GASTRITIS, PRESENCE OF BLEEDING UNSPECIFIED, UNSPECIFIED GASTRITIS TYPE: ICD-10-CM

## 2023-10-28 DIAGNOSIS — N30.01 ACUTE CYSTITIS WITH HEMATURIA: Primary | ICD-10-CM

## 2023-10-28 LAB
ALBUMIN SERPL-MCNC: 3.8 G/DL (ref 3.5–5.2)
ALBUMIN/GLOB SERPL: 1.1 (ref 1.1–2.2)
ALP SERPL-CCNC: 69 U/L (ref 40–129)
ALT SERPL-CCNC: 11 U/L (ref 10–50)
ANION GAP SERPL CALC-SCNC: 10 MMOL/L (ref 5–15)
APPEARANCE UR: ABNORMAL
AST SERPL-CCNC: 17 U/L (ref 10–50)
BACTERIA URNS QL MICRO: ABNORMAL /HPF
BASOPHILS # BLD: 0.1 K/UL (ref 0–1)
BASOPHILS NFR BLD: 1 % (ref 0–1)
BILIRUB SERPL-MCNC: 0.6 MG/DL (ref 0.2–1)
BILIRUB UR QL: NEGATIVE
BUN SERPL-MCNC: 19 MG/DL (ref 6–20)
BUN/CREAT SERPL: 12 (ref 12–20)
CALCIUM SERPL-MCNC: 9.9 MG/DL (ref 8.6–10)
CHLORIDE SERPL-SCNC: 105 MMOL/L (ref 98–107)
CO2 SERPL-SCNC: 25 MMOL/L (ref 22–29)
COLOR UR: ABNORMAL
CREAT SERPL-MCNC: 1.64 MG/DL (ref 0.7–1.2)
DIFFERENTIAL METHOD BLD: ABNORMAL
EOSINOPHIL # BLD: 0.4 K/UL (ref 0–0.4)
EOSINOPHIL NFR BLD: 4 % (ref 0–7)
EPITH CASTS URNS QL MICRO: ABNORMAL /LPF
ERYTHROCYTE [DISTWIDTH] IN BLOOD BY AUTOMATED COUNT: 14.7 % (ref 11.5–14.5)
GLOBULIN SER CALC-MCNC: 3.5 G/DL (ref 2–4)
GLUCOSE SERPL-MCNC: 98 MG/DL (ref 65–100)
GLUCOSE UR STRIP.AUTO-MCNC: NEGATIVE MG/DL
HCT VFR BLD AUTO: 43.3 % (ref 36.6–50.3)
HGB BLD-MCNC: 14.2 G/DL (ref 12.1–17)
HGB UR QL STRIP: ABNORMAL
IMM GRANULOCYTES # BLD AUTO: 0 K/UL (ref 0–0.04)
IMM GRANULOCYTES NFR BLD AUTO: 0 % (ref 0–0.5)
KETONES UR QL STRIP.AUTO: NEGATIVE MG/DL
LEUKOCYTE ESTERASE UR QL STRIP.AUTO: ABNORMAL
LIPASE SERPL-CCNC: 25 U/L (ref 13–60)
LYMPHOCYTES # BLD: 1.8 K/UL (ref 0.8–3.5)
LYMPHOCYTES NFR BLD: 20 % (ref 12–49)
MCH RBC QN AUTO: 28.7 PG (ref 26–34)
MCHC RBC AUTO-ENTMCNC: 32.8 G/DL (ref 30–36.5)
MCV RBC AUTO: 87.7 FL (ref 80–99)
MONOCYTES # BLD: 0.9 K/UL (ref 0–1)
MONOCYTES NFR BLD: 10 % (ref 5–13)
NEUTS SEG # BLD: 6.2 K/UL (ref 1.8–8)
NEUTS SEG NFR BLD: 65 % (ref 32–75)
NITRITE UR QL STRIP.AUTO: POSITIVE
NRBC # BLD: 0 K/UL (ref 0–0.01)
NRBC BLD-RTO: 0 PER 100 WBC
PH UR STRIP: 6.5 (ref 5–8)
PLATELET # BLD AUTO: 293 K/UL (ref 150–400)
PMV BLD AUTO: 9.8 FL (ref 8.9–12.9)
POTASSIUM SERPL-SCNC: 4 MMOL/L (ref 3.5–5.1)
PROT SERPL-MCNC: 7.3 G/DL (ref 6.4–8.3)
PROT UR STRIP-MCNC: NEGATIVE MG/DL
RBC # BLD AUTO: 4.94 M/UL (ref 4.1–5.7)
RBC #/AREA URNS HPF: ABNORMAL /HPF (ref 0–5)
SODIUM SERPL-SCNC: 140 MMOL/L (ref 136–145)
SP GR UR REFRACTOMETRY: 1.01 (ref 1–1.03)
URINE CULTURE IF INDICATED: ABNORMAL
UROBILINOGEN UR QL STRIP.AUTO: 0.2 EU/DL (ref 0.2–1)
WBC # BLD AUTO: 9.4 K/UL (ref 4.1–11.1)
WBC URNS QL MICRO: >100 /HPF (ref 0–4)

## 2023-10-28 PROCEDURE — 6360000002 HC RX W HCPCS: Performed by: EMERGENCY MEDICINE

## 2023-10-28 PROCEDURE — 2580000003 HC RX 258: Performed by: EMERGENCY MEDICINE

## 2023-10-28 PROCEDURE — 96365 THER/PROPH/DIAG IV INF INIT: CPT

## 2023-10-28 PROCEDURE — 99284 EMERGENCY DEPT VISIT MOD MDM: CPT

## 2023-10-28 PROCEDURE — 96361 HYDRATE IV INFUSION ADD-ON: CPT

## 2023-10-28 PROCEDURE — 87086 URINE CULTURE/COLONY COUNT: CPT

## 2023-10-28 PROCEDURE — 83690 ASSAY OF LIPASE: CPT

## 2023-10-28 PROCEDURE — 85025 COMPLETE CBC W/AUTO DIFF WBC: CPT

## 2023-10-28 PROCEDURE — 81001 URINALYSIS AUTO W/SCOPE: CPT

## 2023-10-28 PROCEDURE — 96375 TX/PRO/DX INJ NEW DRUG ADDON: CPT

## 2023-10-28 PROCEDURE — 6370000000 HC RX 637 (ALT 250 FOR IP): Performed by: EMERGENCY MEDICINE

## 2023-10-28 PROCEDURE — 80053 COMPREHEN METABOLIC PANEL: CPT

## 2023-10-28 PROCEDURE — 36415 COLL VENOUS BLD VENIPUNCTURE: CPT

## 2023-10-28 RX ORDER — CEPHALEXIN 500 MG/1
500 CAPSULE ORAL 2 TIMES DAILY
Qty: 14 CAPSULE | Refills: 0 | Status: SHIPPED | OUTPATIENT
Start: 2023-10-28 | End: 2023-11-04

## 2023-10-28 RX ORDER — SUCRALFATE 1 G/1
1 TABLET ORAL 4 TIMES DAILY
Qty: 28 TABLET | Refills: 0 | Status: SHIPPED | OUTPATIENT
Start: 2023-10-28 | End: 2023-11-04

## 2023-10-28 RX ORDER — ONDANSETRON 2 MG/ML
4 INJECTION INTRAMUSCULAR; INTRAVENOUS ONCE
Status: DISCONTINUED | OUTPATIENT
Start: 2023-10-28 | End: 2023-10-28 | Stop reason: HOSPADM

## 2023-10-28 RX ORDER — 0.9 % SODIUM CHLORIDE 0.9 %
1000 INTRAVENOUS SOLUTION INTRAVENOUS ONCE
Status: COMPLETED | OUTPATIENT
Start: 2023-10-28 | End: 2023-10-28

## 2023-10-28 RX ORDER — ONDANSETRON 4 MG/1
4 TABLET, ORALLY DISINTEGRATING ORAL 3 TIMES DAILY PRN
Qty: 21 TABLET | Refills: 0 | Status: SHIPPED | OUTPATIENT
Start: 2023-10-28

## 2023-10-28 RX ORDER — KETOROLAC TROMETHAMINE 30 MG/ML
15 INJECTION, SOLUTION INTRAMUSCULAR; INTRAVENOUS ONCE
Status: COMPLETED | OUTPATIENT
Start: 2023-10-28 | End: 2023-10-28

## 2023-10-28 RX ADMIN — KETOROLAC TROMETHAMINE 15 MG: 30 INJECTION, SOLUTION INTRAMUSCULAR; INTRAVENOUS at 09:28

## 2023-10-28 RX ADMIN — ALUMINUM HYDROXIDE, MAGNESIUM HYDROXIDE, AND SIMETHICONE 40 ML: 200; 200; 20 SUSPENSION ORAL at 11:40

## 2023-10-28 RX ADMIN — PIPERACILLIN AND TAZOBACTAM 4500 MG: 4; .5 INJECTION, POWDER, LYOPHILIZED, FOR SOLUTION INTRAVENOUS at 10:54

## 2023-10-28 RX ADMIN — SODIUM CHLORIDE 1000 ML: 9 INJECTION, SOLUTION INTRAVENOUS at 09:29

## 2023-10-28 ASSESSMENT — LIFESTYLE VARIABLES
HOW MANY STANDARD DRINKS CONTAINING ALCOHOL DO YOU HAVE ON A TYPICAL DAY: PATIENT DOES NOT DRINK
HOW OFTEN DO YOU HAVE A DRINK CONTAINING ALCOHOL: NEVER

## 2023-10-28 ASSESSMENT — ENCOUNTER SYMPTOMS
CONSTIPATION: 0
NAUSEA: 1
VOMITING: 0
SHORTNESS OF BREATH: 0
DIARRHEA: 0
ABDOMINAL PAIN: 1
BACK PAIN: 0
COLOR CHANGE: 0

## 2023-10-28 ASSESSMENT — PAIN SCALES - GENERAL
PAINLEVEL_OUTOF10: 8
PAINLEVEL_OUTOF10: 8

## 2023-10-28 ASSESSMENT — PAIN - FUNCTIONAL ASSESSMENT: PAIN_FUNCTIONAL_ASSESSMENT: 0-10

## 2023-10-28 ASSESSMENT — PAIN DESCRIPTION - LOCATION: LOCATION: ABDOMEN

## 2023-10-28 ASSESSMENT — PAIN DESCRIPTION - ORIENTATION: ORIENTATION: MID

## 2023-10-28 ASSESSMENT — PAIN DESCRIPTION - DESCRIPTORS: DESCRIPTORS: ACHING

## 2023-10-28 NOTE — ED PROVIDER NOTES
[facility] EM[facility]GENCY [facility]PT  EMERGENCY DEPARTMENT ENCOUNTER      Pt Name: Alyx Wilson  MRN: [id number]  [address] [facility] 1985  Date of evaluation: 10/28/2023  Provider: MD Carolee Chavarria       Chief Complaint   Patient presents with    Abdominal Pain    Dysuria    Flank Pain                HISTORY OF PRESENT ILLNESS   (Location/Symptom, Timing/Onset, Context/Setting, Quality, Duration, Modifying Factors, Severity)  Note limiting factors. Alyx Wilson is a 41 y.o. male who presents to the emergency department      The history is provided by the patient and a friend. No  was used. Dysuria   This is a recurrent problem. The current episode started more than 1 week ago. The problem occurs every urination. The problem has not changed since onset. The quality of the pain is described as burning. The pain is moderate. There has been no fever. Associated symptoms include nausea, frequency, urgency and flank pain. Pertinent negatives include no chills, no vomiting and no hematuria. His past medical history is significant for recurrent UTIs. Nursing Notes were reviewed. REVIEW OF SYSTEMS    (2-9 systems for level 4, 10 or more for level 5)     Review of Systems   Constitutional:  Negative for activity change, chills and fever. HENT:  Negative for nosebleeds. Eyes:  Negative for visual disturbance. Respiratory:  Negative for shortness of breath. Cardiovascular:  Negative for chest pain and palpitations. Gastrointestinal:  Positive for abdominal pain and nausea. Negative for constipation, diarrhea and vomiting. Genitourinary:  Positive for dysuria, flank pain, frequency and urgency. Negative for difficulty urinating and hematuria. Musculoskeletal:  Negative for back pain, neck pain and neck stiffness. Skin:  Negative for color change. Allergic/Immunologic: Negative for immunocompromised state.    Neurological:  Negative for dizziness, seizures, syncope,

## 2023-10-28 NOTE — ED NOTES
Pt given discharge instruction, follow up care and prescription information. Pt ambulatory out of ER without any difficulty.       Tenisha Potts RN  10/28/23 0056

## 2023-10-28 NOTE — ED TRIAGE NOTES
Patient arrives with c/o dysuria, bilateral flank and lower back pain, and mid-epigastric pain with intermittent nausea and diarrhea. States urinary sx present for past two weeks and was seen at [place] ED last week and prescribed antibiotics. Reports no improvement of sx. Reports abdominal pain began two days ago. Denies vomiting, fever, chills, body aches.

## 2023-10-29 LAB
BACTERIA SPEC CULT: ABNORMAL
CC UR VC: ABNORMAL
SERVICE CMNT-IMP: ABNORMAL

## 2023-10-30 LAB
BACTERIA SPEC CULT: ABNORMAL
CC UR VC: ABNORMAL
SERVICE CMNT-IMP: ABNORMAL

## 2023-11-01 ENCOUNTER — TELEMEDICINE (OUTPATIENT)
Facility: CLINIC | Age: 38
End: 2023-11-01
Payer: COMMERCIAL

## 2023-11-01 DIAGNOSIS — N18.31 STAGE 3A CHRONIC KIDNEY DISEASE (HCC): ICD-10-CM

## 2023-11-01 DIAGNOSIS — K52.1 ANTIBIOTIC-ASSOCIATED DIARRHEA: Primary | ICD-10-CM

## 2023-11-01 DIAGNOSIS — I10 ESSENTIAL (PRIMARY) HYPERTENSION: ICD-10-CM

## 2023-11-01 DIAGNOSIS — I48.0 PAROXYSMAL ATRIAL FIBRILLATION (HCC): ICD-10-CM

## 2023-11-01 DIAGNOSIS — T36.95XA ANTIBIOTIC-ASSOCIATED DIARRHEA: Primary | ICD-10-CM

## 2023-11-01 PROCEDURE — 99214 OFFICE O/P EST MOD 30 MIN: CPT | Performed by: FAMILY MEDICINE

## 2023-11-01 RX ORDER — METOPROLOL SUCCINATE 100 MG/1
100 TABLET, EXTENDED RELEASE ORAL DAILY
Qty: 90 TABLET | Refills: 1 | Status: SHIPPED | OUTPATIENT
Start: 2023-11-01

## 2023-11-01 RX ORDER — SUCRALFATE 1 G/1
1 TABLET ORAL 4 TIMES DAILY
Qty: 28 TABLET | Refills: 1 | Status: SHIPPED | OUTPATIENT
Start: 2023-11-01 | End: 2023-11-08

## 2023-11-01 RX ORDER — DICLOFENAC SODIUM 75 MG/1
75 TABLET, DELAYED RELEASE ORAL 2 TIMES DAILY
COMMUNITY
Start: 2023-08-30

## 2023-11-01 RX ORDER — DIPHENOXYLATE HYDROCHLORIDE AND ATROPINE SULFATE 2.5; .025 MG/1; MG/1
1 TABLET ORAL 4 TIMES DAILY PRN
Qty: 40 TABLET | Refills: 0 | Status: SHIPPED | OUTPATIENT
Start: 2023-11-01 | End: 2023-11-11

## 2023-11-01 RX ORDER — VALSARTAN AND HYDROCHLOROTHIAZIDE 320; 25 MG/1; MG/1
1 TABLET, FILM COATED ORAL DAILY
Qty: 90 TABLET | Refills: 1 | Status: SHIPPED | OUTPATIENT
Start: 2023-11-01

## 2023-11-01 ASSESSMENT — PATIENT HEALTH QUESTIONNAIRE - PHQ9
2. FEELING DOWN, DEPRESSED OR HOPELESS: 1
SUM OF ALL RESPONSES TO PHQ9 QUESTIONS 1 & 2: 2
SUM OF ALL RESPONSES TO PHQ QUESTIONS 1-9: 2
1. LITTLE INTEREST OR PLEASURE IN DOING THINGS: 1

## 2023-11-01 NOTE — PROGRESS NOTES
Chief Complaint   Patient presents with    Follow-up     1. Have you been to the ER, urgent care clinic since your last visit? Hospitalized since your last visit? Yes 10/17 and 10/28, [facility], nausea nad blood in urine. 2. Have you seen or consulted any other health care providers outside of the [address] [place] [address] Avenue since your last visit? Include any pap smears or colon screening.  No

## 2023-11-01 NOTE — PROGRESS NOTES
Sanjiv Salas is a 41 y.o. male who was seen by synchronous (real-time) audio-video technology on 11/1/2023. Consent: Sanjiv Salas, who was seen by synchronous (real-time) audio-video technology, and/or his healthcare decision maker, is aware that this patient-initiated, Telehealth encounter on 11/1/2023 is a billable service, with coverage as determined by his insurance carrier. He is aware that he may receive a bill and has provided verbal consent to proceed: Yes. Assessment & Plan:      Diagnosis Orders   1. Antibiotic-associated diarrhea  Clostridium Difficile Toxin/Antigen    sucralfate (CARAFATE) 1 GM tablet    diphenoxylate-atropine (LOMOTIL) 2.5-0.025 MG per tablet      2. Stage 3a chronic kidney disease (HCC)  valsartan-hydroCHLOROthiazide (DIOVAN-HCT) 320-25 MG per tablet      3. Essential (primary) hypertension  valsartan-hydroCHLOROthiazide (DIOVAN-HCT) 320-25 MG per tablet    metoprolol succinate (TOPROL XL) 100 MG extended release tablet      4. Paroxysmal atrial fibrillation (HCC)  valsartan-hydroCHLOROthiazide (DIOVAN-HCT) 320-25 MG per tablet    metoprolol succinate (TOPROL XL) 100 MG extended release tablet        Cdiff testing if not improving symptoms given number of abx recently  Lomotil as long as not cdiff pos    Resume valsartan hctz  Continue metoprolol    Pt at the end of the month is unexpectedly moving to [facility]  Recommend establish care out there with pcp and urology at minimum  Care everywhere/my chart make sharing records easy/accessible    Fu with me if back in town or as needed        Pt was counseled on risks, benefits and alternatives of treatment options. All questions were asked and answered and the patient was agreeable with the treatment plan as outlined.     Subjective:   Sanjiv Salas is a 41 y.o. male who was seen for Follow-up (Needs referral for [facility].)      2 ER visits in the last one month    He tells me he is going to be

## 2024-01-08 DIAGNOSIS — I48.0 PAROXYSMAL ATRIAL FIBRILLATION (HCC): ICD-10-CM

## 2024-01-08 DIAGNOSIS — I10 ESSENTIAL (PRIMARY) HYPERTENSION: ICD-10-CM

## 2024-01-08 RX ORDER — METOPROLOL SUCCINATE 100 MG/1
100 TABLET, EXTENDED RELEASE ORAL DAILY
Qty: 90 TABLET | Refills: 3 | Status: SHIPPED | OUTPATIENT
Start: 2024-01-08